# Patient Record
(demographics unavailable — no encounter records)

---

## 2024-10-23 NOTE — HISTORY OF PRESENT ILLNESS
[FreeTextEntry1] : This patient is seen for an office visit.  He continues to take carbidopa levodopa 25/100 mg ER twice daily.  Higher doses caused side effects.  He is basically unchanged regarding gait and balance.  He does complain of being forgetful.  Tremor is diminished.  There is depression.  He has spinal stenosis.  He was found to have paroxysmal atrial fibrillation started Eliquis.  There is hyperlipidemia and hypertension.  Medications have been reviewed.  Brain DaTscan was normal.  FDG brain PET scan did suggest neurodegenerative disorder difficult to characterize.

## 2024-10-23 NOTE — ASSESSMENT
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  His DaTscan was normal.  His FDG brain PET scan did suggest a neurodegenerative disorder difficult to characterize.  His exam is actually showing improvement while taking a relatively low dose of carbidopa levodopa.  There are comorbidities of depression and lumbar spinal stenosis.  Recommendations: Continue carbidopa levodopa 25/100 mg ER twice daily.  Physical therapy.  Psychiatric follow-up.  Office follow-up.

## 2024-10-23 NOTE — REASON FOR VISIT
[Follow-Up: _____] : a [unfilled] follow-up visit [FreeTextEntry1] : Gait disorder atypical parkinsonism forgetful

## 2024-10-24 NOTE — DISCUSSION/SUMMARY
[Patient] : the patient [Risks] : risks [Benefits] : benefits [FreeTextEntry1] : Will scan in and review records.  Obtain cardiac monitor placed today.  Call me for results.  Discussed other options for anticoagulant due to cost considerations he and wife will check on coverage.  Follow-up in several months.  Call me for results [EKG obtained to assist in diagnosis and management of assessed problem(s)] : EKG obtained to assist in diagnosis and management of assessed problem(s)

## 2024-10-24 NOTE — ASSESSMENT
[FreeTextEntry1] : Recent diagnosis of PAF currently on beta-blocker and Eliquis.  Elevated blood pressure.

## 2024-10-24 NOTE — PHYSICAL EXAM
[Normal] : clear lung fields, good air entry, no respiratory distress [Soft] : abdomen soft [No Edema] : no edema [de-identified] : Proptosis

## 2024-10-24 NOTE — CARDIOLOGY SUMMARY
[de-identified] : October 13, 2022 sinus rhythm Evi 15, 2023 sinus rhythm APC VPC May 6, 2024 sinus with frequent PVCs October 24, 2024 sinus rhythm [de-identified] : 2022 sinus rhythm AIVR [de-identified] : November 2022 [de-identified] : June 2022 normal LV systolic function 2023 normal lv, asymmetric hypertrophy mild subaortic gradient [de-identified] : 2023 coronary calcium score 2069.  Mild to moderate coronary atherosclerotic disease with negative FFR in all measured vessels

## 2024-10-24 NOTE — HISTORY OF PRESENT ILLNESS
[FreeTextEntry1] : Found to have A-fib on a routine EKG was hospitalized records being scanned into chart normal LV function negative nuclear stress study reported.  No chest pain dyspnea palpitations.  Wear support stockings for edema.

## 2024-11-05 NOTE — HISTORY OF PRESENT ILLNESS
[FreeTextEntry1] : Rough spot on right forearm x 3 months. No treatment. [de-identified] : h/o malignant pilomatricoma back ~25 years ago, bcc left temple. Retired physician.

## 2024-11-05 NOTE — ASSESSMENT
[FreeTextEntry1] :   Alert, oriented, well pleasant.   Sun-exposed cutaneous exam. No evidence of cutaneous malignancy. Brown, yellow papules and plaques generalized. Seborrheic keratoses. No treatment. Actinic damage. Reviewed sun protection. Dry scaly patches generalized especially arms and legs.  Asteototic dermatitis. Moisturize daily.  Erythematous papulonodular face. Blepharitis being treated by ophthalmology.    start metronidazole gel twice per day.  Erythematous scaly papules x1 right forearm 0.3cm, x4 face 0.3-0.6cm. 5  Solar Keratoses. Informed consent given. Side effects discussed. Cryotherapy each. Tolerated well. Wound care instructed.  Follow up 1 year.

## 2024-12-17 NOTE — ASSESSMENT
[FreeTextEntry1] : Impression: This 79-year-old male patient has a chronic gait disorder cognitive decline and depression.  His DaTscan was normal.  His FDG brain PET scan did suggest neurodegenerative disorder difficult to characterize.  There are comorbidities of depression lumbar spinal stenosis and other medical comorbidities.  Recommendations: Continue carbidopa levodopa 25/100 mg ER once or twice daily.  Higher doses produce side effects.  Physical therapy.  Psychiatric follow-up.  Office follow-up.

## 2024-12-17 NOTE — PHYSICAL EXAM
[FreeTextEntry1] : Head:  Normocephalic Neck: Supple.  Mental Status:  Alert Oriented X3 Speech normal and no aphasia or dysarthria.  Hypophonia.  Cranial Nerves:  PERRL, Visual Fields full  EOMI no diplopia no ptosis no nystagmus, V through XII intact.  No definite facial masking.  Motor unchanged bradykinesia and mild upper extremity cogwheeling with significant decrease in the tremor.  DTRs: Symmetric and 2+.   Sensory:  Normal testing with pin light touch.  Gait: Short stepped widened base poor posture mildly bradykinetic no tremor.

## 2024-12-17 NOTE — REASON FOR VISIT
[Follow-Up: _____] : a [unfilled] follow-up visit [FreeTextEntry1] : Atypical parkinsonism gait disorder forgetful

## 2024-12-17 NOTE — HISTORY OF PRESENT ILLNESS
[FreeTextEntry1] : This patient is seen for an office visit.  He has been taking levodopa 25/100 mg ER once or twice daily.  Higher doses produced side effects.  He has been pursuing physical therapy at [facility].    He follows with psychiatry and his Pristiq has been increased to 100 mg daily.  He does have significant depression.    He has a history of spinal stenosis paroxysmal atrial fibrillation hyperlipidemia hypertension.  Multiple medications have been reviewed.  He is receiving Eliquis.  Brain DaTscan was normal.  FDG brain PET scan suggested neurodegenerative disorder difficult to characterize.

## 2025-03-11 NOTE — HISTORY OF PRESENT ILLNESS
[FreeTextEntry1] : This patient is seen for an office visit and is essentially unchanged while taking intermittent carbidopa levodopa 25/100 mg ER once or twice daily.  Higher doses produced side effects.  He continues to pursue physical therapy at [facility].  He continues to follow with psychiatry and he has been tried on multiple different antidepressants most recently Prozac.  There is no other change in his medication which have been reviewed.  He continues to have difficulty with cognition his hearing is poor and he describes himself as being bradykinetic.  He has a history of spinal stenosis paroxysmal atrial fibrillation hypertension hyperlipidemia.  Brain DaTscan was unremarkable, and FDG brain PET scan suggested neurodegenerative disorder difficult to characterize.

## 2025-03-11 NOTE — PHYSICAL EXAM
[FreeTextEntry1] : Head:  Normocephalic Neck: Supple.  Mental Status:  Alert Oriented X3 Speech normal and no aphasia or dysarthria.  Improved hypophonia  Cranial Nerves:  PERRL, Visual Fields full  EOMI no diplopia no ptosis no nystagmus, V through XII intact.  No definite facial masking.  Motor: Less significant bradykinesia and no definite cogwheeling today.  No significant tremor.  DTRs: Symmetric and 2+.   Sensory:  Normal testing with pin light touch.  Gait: Short stepped widened base poor posture mildly bradykinetic no tremor.

## 2025-03-11 NOTE — ASSESSMENT
[FreeTextEntry1] : Impression: This 79-year-old male patient has a chronic gait disorder cognitive decline and depression.  His DaTscan was normal and his safety G brain PET scan did suggest neurodegenerative disorder difficult to characterize.  There are comorbidities of depression lumbar spinal stenosis and other medical issues.  Diagnosis remains unclear.  Today's exam shows improvement though is not been taking regular levodopa.  Recommendations: Continue carbidopa levodopa 25/100 mg ER once or twice daily.  Higher doses produced side effect.  Physical therapy.  Office follow-up.

## 2025-03-28 NOTE — HISTORY OF PRESENT ILLNESS
[FreeTextEntry1] : Found to have A-fib on a routine EKG was hospitalized records being scanned into chart normal LV function negative nuclear stress study reported  Med list updated.  There is a lot of anxiety.  Ports BP is usually normal.  No chest pain or palpitations.  Fatigues easily after a block and a half of walking.  No bleeding issues or falls.  Treated for Parkinson's.

## 2025-03-28 NOTE — ASSESSMENT
[FreeTextEntry1] : Recent diagnosis of PAF currently on beta-blocker and Eliquis.  Elevated blood pressure. Anxiety.  Parkinsonism.  Bradycardia likely from medication with drug interaction with antidepressant antianxiety medicines suspect in this situation conduction disease.

## 2025-03-28 NOTE — CARDIOLOGY SUMMARY
[de-identified] : October 13, 2022 sinus rhythm Evi 15, 2023 sinus rhythm APC VPC May 6, 2024 sinus with frequent PVCs October 24, 2024 sinus rhythm April 20, 2025 sinus bradycardia RBBB [de-identified] : 2022 sinus rhythm AIVR [de-identified] : November 2022 [de-identified] : June 2022 normal LV systolic function 2023 normal lv, asymmetric hypertrophy mild subaortic gradient [de-identified] : 2023 coronary calcium score 2069.  Mild to moderate coronary atherosclerotic disease with negative FFR in all measured vessels

## 2025-03-28 NOTE — PHYSICAL EXAM
[Normal] : clear lung fields, good air entry, no respiratory distress [Soft] : abdomen soft [No Edema] : no edema [de-identified] : Proptosis

## 2025-03-28 NOTE — RESULTS/DATA
[TextEntry] : BUN 16 potassium 4.5 normal chemistry profile cholesterol 154 HDL 47 LDL 85 triglyceride 121 hematocrit 45.0 A1c 5.6 TSH 0.887 Free T41.1

## 2025-03-28 NOTE — REASON FOR VISIT
[Symptom and Test Evaluation] : symptom and test evaluation [Arrhythmia/ECG Abnorrmalities] : arrhythmia/ECG abnormalities [Hypertension] : hypertension [FreeTextEntry3] : Jay Jay

## 2025-03-28 NOTE — DISCUSSION/SUMMARY
[Patient] : the patient [Risks] : risks [Benefits] : benefits [FreeTextEntry1] : Will reduce his metoprolol in half but double his losartan.  Low-sodium diet. Patient counseled verbally and in writing regarding low sodium diet, handout given. Return in 2 weeks with attention to heart rate blood pressure and symptoms.  Echocardiogram as well to reassess previous abnormal findings.  Questions addressed with him. [EKG obtained to assist in diagnosis and management of assessed problem(s)] : EKG obtained to assist in diagnosis and management of assessed problem(s)

## 2025-04-02 NOTE — ASSESSMENT
[FreeTextEntry1] : Alert, oriented, well, pleasant.  flesh colored painful papule mid chest. ?nevus dome shaped flesh colored papule left anterior shoulder ?bcc dome shaped eroded papule left posterior shoulder ?bcc  Neoplasm uncertain behavior Informed consent given. Side effects discussed.  Lidocaine epi.  Shave biopsy each.  Ferric Chloride.  Tolerated well.  Wound care instructions given.

## 2025-04-02 NOTE — HISTORY OF PRESENT ILLNESS
[FreeTextEntry1] : c/o painful bump mid chest. Tender growing bumps on shoulder noted for 2-3 weeks. [de-identified] : personal history bcc and pilomatricoma.

## 2025-04-03 NOTE — DISCUSSION/SUMMARY
[Patient] : the patient [Risks] : risks [Benefits] : benefits [FreeTextEntry1] : Will reduce metoprolol further to 12.5 mg twice daily pulmonary referral See me again in 3 months or earlier as needed reviewed his previous cardiac workup including CTA without obstructive disease.  Discussed diet and weight loss.

## 2025-04-03 NOTE — REASON FOR VISIT
[Symptom and Test Evaluation] : symptom and test evaluation [Arrhythmia/ECG Abnorrmalities] : arrhythmia/ECG abnormalities [Hypertension] : hypertension [Spouse] : spouse [FreeTextEntry3] : Jay Jay

## 2025-04-03 NOTE — CARDIOLOGY SUMMARY
[de-identified] : October 13, 2022 sinus rhythm Evi 15, 2023 sinus rhythm APC VPC May 6, 2024 sinus with frequent PVCs October 24, 2024 sinus rhythm April 20, 2025 sinus bradycardia RBBB [de-identified] : 2022 sinus rhythm AIVR [de-identified] : November 2022 [de-identified] : June 2022 normal LV systolic function 2023 normal lv, asymmetric hypertrophy mild subaortic gradient April 2025 normal LV mild basal septal hypertrophy normal systolic performance [de-identified] : 2023 coronary calcium score 2069.  Mild to moderate coronary atherosclerotic disease with negative FFR in all measured vessels

## 2025-04-03 NOTE — HISTORY OF PRESENT ILLNESS
[FreeTextEntry1] : Found to have A-fib on a routine EKG was hospitalized records being scanned into chart normal LV function negative nuclear stress study reported Last visit reduced his metoprolol but while he is not exercising he still feels about the same some shortness of breath at rest and limited ability to exert.  He is treated for parkinsonism.  He is trying to lose weight.

## 2025-04-03 NOTE — PHYSICAL EXAM
[Normal] : clear lung fields, good air entry, no respiratory distress [No Edema] : no edema [de-identified] : Proptosis

## 2025-04-03 NOTE — ASSESSMENT
[FreeTextEntry1] : Recent diagnosis of PAF currently on beta-blocker and Eliquis.  Elevated blood pressure.  Overweight Anxiety.  Parkinsonism.

## 2025-05-08 NOTE — HISTORY OF PRESENT ILLNESS
[FreeTextEntry1] : scc left anterior shoulder [de-identified] : s/p biopsy healing well Options discussed.

## 2025-05-08 NOTE — ASSESSMENT
[FreeTextEntry1] : Alert, oriented, well, pleasant.  Informed consent given. Side effects reviewed. Aseptic. Lidocaine epi. 2.3 cm. Scalpel excision through subcutis with normal margins. Undermining. Hemostasis. 5.6 cm. Layered closure. 4-0 Monocryl Tolerated well. Pressure dressing. Wound care instructed. 2 weeks.

## 2025-05-15 NOTE — ASSESSMENT
[FreeTextEntry1] : Alert, oriented, well, pleasant.  rim of erythema 3mm flat surrounding dry and intact wound. resolving ecchymosis at inferior margin near axillary fold. No tender.  Healing well. discontinue Bactin restart petrolatum and dressing daily. f/u 5/22 as scheduled.  Path reviewed.

## 2025-05-15 NOTE — HISTORY OF PRESENT ILLNESS
[FreeTextEntry1] : "Is it infected?" [de-identified] : states red at wound. Denies pain or discharge.

## 2025-05-22 NOTE — HISTORY OF PRESENT ILLNESS
[FreeTextEntry1] : bcc left posterior shoulder. [de-identified] : s/p excision scc left anterior shoulder.

## 2025-05-22 NOTE — ASSESSMENT
[FreeTextEntry1] : Alert, oriented, well, pleasant.  basal cell carcinoma left posterior shoulder. Options discussed.      Informed consent given. Side effects reviewed.   Xylo epi. Desiccation and curettage x3.   Tolerated well. Wound care instructed.   2.4x2.6cm.    f/u 1 month Also for cryo solar keratosis left ear.   s/p excision scc left anterior shoulder healing well. No complaints. sutures removed.  steri strips applied.

## 2025-06-10 NOTE — HISTORY OF PRESENT ILLNESS
[FreeTextEntry1] : This patient is seen for an office visit.  He is a 79-year-old male patient who is basically unchanged while taking carbidopa levodopa 25/100 mg ER twice daily.  Higher doses produced side effects.  He has physical therapy continuing which she attends at [facility].  He follows regularly with psychiatrist for depression and is not receiving Prozac.  There is no other significant change in his medication.  Continues to have a gait disorder as previously described.  He has a history of spinal stenosis and paroxysmal atrial fibrillation hypertension hyperlipidemia.  Brain DaTscan was unremarkable and FDG brain PET scan suggested neurodegenerative disorder difficult to characterize.

## 2025-06-10 NOTE — ASSESSMENT
[FreeTextEntry1] : Impression: This 79-year-old patient has a chronic cognitive decline and gait disorder complicated by severe depression.  DaTscan was normal and the FDG brain PET scan did suggest neurodegenerative disorder difficult to characterize.  There is comorbidity of lumbar spinal stenosis and other medical issues.  Diagnosis remains unclear today's exam is stable.  Recommendations: Continue carbidopa levodopa 25/100 mg ER twice daily.  Higher doses produced side effects.  Continue physical therapy.  Continue psychiatric follow-up.  Office follow-up.

## 2025-06-10 NOTE — PHYSICAL EXAM
[FreeTextEntry1] : Head:  Normocephalic Neck: Supple.  Mental Status:  Alert Oriented X3 Speech normal and no aphasia or dysarthria.  Mild hypophonia.  Cranial Nerves:  PERRL, Visual Fields full  EOMI no diplopia no ptosis no nystagmus, V through XII intact.  Mild facial masking.  Motor: Less significant bradykinesia and no definite cogwheeling.  No significant tremor.  DTRs: Symmetric and 2+.   Sensory:  Normal testing with pin light touch.  Gait: Short stepped widened base poor posture mildly bradykinetic decreased arm swing no tremor.

## 2025-06-26 NOTE — ASSESSMENT
[FreeTextEntry1] : Alert, oriented, well, pleasant.  1.4cm (from 2.4cm) clean granulation with surrounding pink erythema. left posterior shoulder bcc site. Healing well. Continue petrolatum and dressing daily. f/u 2 months.  erythematous scaly papules left ear 3mm, right hand and distal forearm x3 3-5mm 4 Solar Keratosis.  Informed consent given. Side effects discussed. Cryotherapy. Tolerated well. Wound care instructed.

## 2025-06-26 NOTE — HISTORY OF PRESENT ILLNESS
[FreeTextEntry1] : f/u bcc left shoulder posterior s/p d&c. c/o bumps on left ear and right hand for months. No treatment.

## 2025-06-27 NOTE — DISCUSSION/SUMMARY
[Patient] : the patient [Risks] : risks [Benefits] : benefits [FreeTextEntry1] : Med list updated.  Monitor BP and reassess further on follow-up he is aware of potential side effects of steroids including elevated blood pressure risk of recurrent atrial fibrillation and volume increase. See me again in 3 months or earlier as needed reviewed his previous cardiac workup including CTA without obstructive disease.  Discussed diet and weight loss. [EKG obtained to assist in diagnosis and management of assessed problem(s)] : EKG obtained to assist in diagnosis and management of assessed problem(s)

## 2025-06-27 NOTE — PHYSICAL EXAM
[Normal] : clear lung fields, good air entry, no respiratory distress [Soft] : abdomen soft [No Edema] : no edema [de-identified] : Proptosis

## 2025-06-27 NOTE — CARDIOLOGY SUMMARY
[de-identified] : October 13, 2022 sinus rhythm Evi 15, 2023 sinus rhythm APC VPC May 6, 2024 sinus with frequent PVCs October 24, 2024 sinus rhythm April 20, 2025 sinus bradycardia RBBB June 27, 2025 sinus rhythm RBBB [de-identified] : 2022 sinus rhythm AIVR [de-identified] : November 2022 [de-identified] : 2023 coronary calcium score 2069.  Mild to moderate coronary atherosclerotic disease with negative FFR in all measured vessels [de-identified] : June 2022 normal LV systolic function 2023 normal lv, asymmetric hypertrophy mild subaortic gradient April 2025 normal LV mild basal septal hypertrophy normal systolic performance

## 2025-06-27 NOTE — ASSESSMENT
[FreeTextEntry1] : Recent diagnosis of PAF currently on beta-blocker and Eliquis.  Elevated blood pressure.  Overweight Anxiety.  Parkinsonism.  Patient currently on steroids for back pain

## 2025-06-27 NOTE — HISTORY OF PRESENT ILLNESS
[FreeTextEntry1] : Found to have A-fib on a routine EKG was hospitalized records being scanned into chart normal LV function negative nuclear stress study reported  Treated for Parkinson's.  Currently on Medrol for back pain past 2 days.  No significant palpitations has chronic dyspnea on exertion no edema or chest discomfort.